# Patient Record
Sex: FEMALE | Race: OTHER | HISPANIC OR LATINO | Employment: FULL TIME | URBAN - METROPOLITAN AREA
[De-identification: names, ages, dates, MRNs, and addresses within clinical notes are randomized per-mention and may not be internally consistent; named-entity substitution may affect disease eponyms.]

---

## 2024-01-27 ENCOUNTER — HOSPITAL ENCOUNTER (OUTPATIENT)
Facility: MEDICAL CENTER | Age: 24
End: 2024-01-29
Attending: HOSPITALIST | Admitting: HOSPITALIST
Payer: COMMERCIAL

## 2024-01-27 ENCOUNTER — HOSPITAL ENCOUNTER (OUTPATIENT)
Dept: RADIOLOGY | Facility: MEDICAL CENTER | Age: 24
End: 2024-01-27

## 2024-01-27 DIAGNOSIS — D50.9 MICROCYTIC ANEMIA: ICD-10-CM

## 2024-01-27 DIAGNOSIS — J10.1 INFLUENZA A: ICD-10-CM

## 2024-01-27 PROBLEM — R55 SYNCOPE AND COLLAPSE: Status: ACTIVE | Noted: 2024-01-27

## 2024-01-27 PROBLEM — K81.0 ACUTE CHOLECYSTITIS: Status: ACTIVE | Noted: 2024-01-27

## 2024-01-27 PROBLEM — R79.89 ELEVATED LFTS: Status: ACTIVE | Noted: 2024-01-27

## 2024-01-27 PROBLEM — E87.6 HYPOKALEMIA: Status: ACTIVE | Noted: 2024-01-27

## 2024-01-27 PROBLEM — K83.8 COMMON BILE DUCT DILATION: Status: ACTIVE | Noted: 2024-01-27

## 2024-01-27 LAB
INR PPP: 1.04 (ref 0.87–1.13)
LACTATE SERPL-SCNC: 0.9 MMOL/L (ref 0.5–2)
PROTHROMBIN TIME: 14 SEC (ref 12–14.6)
TROPONIN T SERPL-MCNC: <6 NG/L (ref 6–19)

## 2024-01-27 PROCEDURE — 700105 HCHG RX REV CODE 258: Performed by: HOSPITALIST

## 2024-01-27 PROCEDURE — 94760 N-INVAS EAR/PLS OXIMETRY 1: CPT

## 2024-01-27 PROCEDURE — 84484 ASSAY OF TROPONIN QUANT: CPT

## 2024-01-27 PROCEDURE — 83605 ASSAY OF LACTIC ACID: CPT

## 2024-01-27 PROCEDURE — 700111 HCHG RX REV CODE 636 W/ 250 OVERRIDE (IP): Mod: JZ | Performed by: HOSPITALIST

## 2024-01-27 PROCEDURE — 96365 THER/PROPH/DIAG IV INF INIT: CPT

## 2024-01-27 PROCEDURE — 85610 PROTHROMBIN TIME: CPT

## 2024-01-27 PROCEDURE — 99223 1ST HOSP IP/OBS HIGH 75: CPT | Performed by: HOSPITALIST

## 2024-01-27 PROCEDURE — 96367 TX/PROPH/DG ADDL SEQ IV INF: CPT

## 2024-01-27 PROCEDURE — 36415 COLL VENOUS BLD VENIPUNCTURE: CPT

## 2024-01-27 PROCEDURE — 93005 ELECTROCARDIOGRAM TRACING: CPT | Performed by: HOSPITALIST

## 2024-01-27 PROCEDURE — 770001 HCHG ROOM/CARE - MED/SURG/GYN PRIV*

## 2024-01-27 PROCEDURE — 87040 BLOOD CULTURE FOR BACTERIA: CPT | Mod: 91

## 2024-01-27 RX ORDER — SODIUM CHLORIDE, SODIUM LACTATE, POTASSIUM CHLORIDE, AND CALCIUM CHLORIDE .6; .31; .03; .02 G/100ML; G/100ML; G/100ML; G/100ML
500 INJECTION, SOLUTION INTRAVENOUS
Status: DISCONTINUED | OUTPATIENT
Start: 2024-01-27 | End: 2024-01-28

## 2024-01-27 RX ORDER — METRONIDAZOLE 500 MG/100ML
500 INJECTION, SOLUTION INTRAVENOUS EVERY 12 HOURS
Status: DISCONTINUED | OUTPATIENT
Start: 2024-01-27 | End: 2024-01-28

## 2024-01-27 RX ORDER — ONDANSETRON 2 MG/ML
4 INJECTION INTRAMUSCULAR; INTRAVENOUS EVERY 4 HOURS PRN
Status: ACTIVE | OUTPATIENT
Start: 2024-01-27 | End: 2024-01-28

## 2024-01-27 RX ORDER — OSELTAMIVIR PHOSPHATE 75 MG/1
75 CAPSULE ORAL 2 TIMES DAILY
Status: DISCONTINUED | OUTPATIENT
Start: 2024-01-28 | End: 2024-01-29 | Stop reason: HOSPADM

## 2024-01-27 RX ORDER — LABETALOL HYDROCHLORIDE 5 MG/ML
10 INJECTION, SOLUTION INTRAVENOUS EVERY 4 HOURS PRN
Status: ACTIVE | OUTPATIENT
Start: 2024-01-27 | End: 2024-01-28

## 2024-01-27 RX ORDER — ACETAMINOPHEN 500 MG
1000 TABLET ORAL 2 TIMES DAILY PRN
COMMUNITY

## 2024-01-27 RX ORDER — ACETAMINOPHEN 325 MG/1
650 TABLET ORAL EVERY 6 HOURS PRN
Status: ACTIVE | OUTPATIENT
Start: 2024-01-27 | End: 2024-01-28

## 2024-01-27 RX ADMIN — METRONIDAZOLE 500 MG: 5 INJECTION, SOLUTION INTRAVENOUS at 23:57

## 2024-01-27 RX ADMIN — CEFTRIAXONE SODIUM 2000 MG: 2 INJECTION, POWDER, FOR SOLUTION INTRAMUSCULAR; INTRAVENOUS at 23:03

## 2024-01-27 ASSESSMENT — LIFESTYLE VARIABLES
EVER_SMOKED: NEVER
ON A TYPICAL DAY WHEN YOU DRINK ALCOHOL HOW MANY DRINKS DO YOU HAVE: 0
EVER FELT BAD OR GUILTY ABOUT YOUR DRINKING: NO
TOTAL SCORE: 0
HAVE YOU EVER FELT YOU SHOULD CUT DOWN ON YOUR DRINKING: NO
CONSUMPTION TOTAL: NEGATIVE
TOTAL SCORE: 0
AVERAGE NUMBER OF DAYS PER WEEK YOU HAVE A DRINK CONTAINING ALCOHOL: 0
HAVE PEOPLE ANNOYED YOU BY CRITICIZING YOUR DRINKING: NO
EVER HAD A DRINK FIRST THING IN THE MORNING TO STEADY YOUR NERVES TO GET RID OF A HANGOVER: NO
TOTAL SCORE: 0
HOW MANY TIMES IN THE PAST YEAR HAVE YOU HAD 5 OR MORE DRINKS IN A DAY: 0
ALCOHOL_USE: NO

## 2024-01-27 ASSESSMENT — ENCOUNTER SYMPTOMS
DOUBLE VISION: 0
DIZZINESS: 0
NECK PAIN: 0
FEVER: 1
CHILLS: 1
NAUSEA: 1
SORE THROAT: 0
SHORTNESS OF BREATH: 0
ABDOMINAL PAIN: 1
INSOMNIA: 0
DEPRESSION: 0
VOMITING: 0
WEAKNESS: 0
FALLS: 1
HEADACHES: 0
MYALGIAS: 0
BRUISES/BLEEDS EASILY: 0
BLURRED VISION: 0
PALPITATIONS: 0
COUGH: 0

## 2024-01-27 ASSESSMENT — COGNITIVE AND FUNCTIONAL STATUS - GENERAL
SUGGESTED CMS G CODE MODIFIER MOBILITY: CH
SUGGESTED CMS G CODE MODIFIER DAILY ACTIVITY: CH
DAILY ACTIVITIY SCORE: 24
MOBILITY SCORE: 24

## 2024-01-27 ASSESSMENT — PATIENT HEALTH QUESTIONNAIRE - PHQ9
SUM OF ALL RESPONSES TO PHQ9 QUESTIONS 1 AND 2: 0
1. LITTLE INTEREST OR PLEASURE IN DOING THINGS: NOT AT ALL
2. FEELING DOWN, DEPRESSED, IRRITABLE, OR HOPELESS: NOT AT ALL
2. FEELING DOWN, DEPRESSED, IRRITABLE, OR HOPELESS: NOT AT ALL
SUM OF ALL RESPONSES TO PHQ9 QUESTIONS 1 AND 2: 0
1. LITTLE INTEREST OR PLEASURE IN DOING THINGS: NOT AT ALL

## 2024-01-27 ASSESSMENT — PAIN DESCRIPTION - PAIN TYPE
TYPE: ACUTE PAIN
TYPE: ACUTE PAIN

## 2024-01-27 ASSESSMENT — COPD QUESTIONNAIRES
COPD SCREENING SCORE: 0
HAVE YOU SMOKED AT LEAST 100 CIGARETTES IN YOUR ENTIRE LIFE: NO/DON'T KNOW
DURING THE PAST 4 WEEKS HOW MUCH DID YOU FEEL SHORT OF BREATH: NONE/LITTLE OF THE TIME
DO YOU EVER COUGH UP ANY MUCUS OR PHLEGM?: NO/ONLY WITH OCCASIONAL COLDS OR INFECTIONS

## 2024-01-28 ENCOUNTER — APPOINTMENT (OUTPATIENT)
Dept: RADIOLOGY | Facility: MEDICAL CENTER | Age: 24
End: 2024-01-28
Attending: HOSPITALIST
Payer: COMMERCIAL

## 2024-01-28 ENCOUNTER — APPOINTMENT (OUTPATIENT)
Dept: CARDIOLOGY | Facility: MEDICAL CENTER | Age: 24
End: 2024-01-28
Attending: HOSPITALIST
Payer: COMMERCIAL

## 2024-01-28 LAB
ALBUMIN SERPL BCP-MCNC: 3.3 G/DL (ref 3.2–4.9)
ALBUMIN/GLOB SERPL: 1.5 G/DL
ALP SERPL-CCNC: 42 U/L (ref 30–99)
ALT SERPL-CCNC: 92 U/L (ref 2–50)
ANION GAP SERPL CALC-SCNC: 11 MMOL/L (ref 7–16)
AST SERPL-CCNC: 89 U/L (ref 12–45)
BASOPHILS # BLD AUTO: 0.2 % (ref 0–1.8)
BASOPHILS # BLD: 0.01 K/UL (ref 0–0.12)
BILIRUB SERPL-MCNC: 0.2 MG/DL (ref 0.1–1.5)
BUN SERPL-MCNC: 9 MG/DL (ref 8–22)
CALCIUM ALBUM COR SERPL-MCNC: 8.4 MG/DL (ref 8.5–10.5)
CALCIUM SERPL-MCNC: 7.8 MG/DL (ref 8.4–10.2)
CHLORIDE SERPL-SCNC: 109 MMOL/L (ref 96–112)
CO2 SERPL-SCNC: 19 MMOL/L (ref 20–33)
CREAT SERPL-MCNC: 0.46 MG/DL (ref 0.5–1.4)
EOSINOPHIL # BLD AUTO: 0.01 K/UL (ref 0–0.51)
EOSINOPHIL NFR BLD: 0.2 % (ref 0–6.9)
ERYTHROCYTE [DISTWIDTH] IN BLOOD BY AUTOMATED COUNT: 51.5 FL (ref 35.9–50)
GFR SERPLBLD CREATININE-BSD FMLA CKD-EPI: 139 ML/MIN/1.73 M 2
GLOBULIN SER CALC-MCNC: 2.2 G/DL (ref 1.9–3.5)
GLUCOSE SERPL-MCNC: 72 MG/DL (ref 65–99)
HCT VFR BLD AUTO: 28.7 % (ref 37–47)
HGB BLD-MCNC: 8.8 G/DL (ref 12–16)
IMM GRANULOCYTES # BLD AUTO: 0 K/UL (ref 0–0.11)
IMM GRANULOCYTES NFR BLD AUTO: 0 % (ref 0–0.9)
IRON SATN MFR SERPL: 11 % (ref 15–55)
IRON SERPL-MCNC: 38 UG/DL (ref 40–170)
LACTATE SERPL-SCNC: 0.8 MMOL/L (ref 0.5–2)
LV EJECT FRACT  99904: 60
LYMPHOCYTES # BLD AUTO: 2.37 K/UL (ref 1–4.8)
LYMPHOCYTES NFR BLD: 59.1 % (ref 22–41)
MCH RBC QN AUTO: 21.7 PG (ref 27–33)
MCHC RBC AUTO-ENTMCNC: 30.7 G/DL (ref 32.2–35.5)
MCV RBC AUTO: 70.9 FL (ref 81.4–97.8)
MONOCYTES # BLD AUTO: 0.26 K/UL (ref 0–0.85)
MONOCYTES NFR BLD AUTO: 6.5 % (ref 0–13.4)
NEUTROPHILS # BLD AUTO: 1.36 K/UL (ref 1.82–7.42)
NEUTROPHILS NFR BLD: 34 % (ref 44–72)
NRBC # BLD AUTO: 0 K/UL
NRBC BLD-RTO: 0 /100 WBC (ref 0–0.2)
PLATELET # BLD AUTO: 217 K/UL (ref 164–446)
PMV BLD AUTO: 9.7 FL (ref 9–12.9)
POTASSIUM SERPL-SCNC: 3.8 MMOL/L (ref 3.6–5.5)
PROT SERPL-MCNC: 5.5 G/DL (ref 6–8.2)
RBC # BLD AUTO: 4.05 M/UL (ref 4.2–5.4)
SODIUM SERPL-SCNC: 139 MMOL/L (ref 135–145)
TIBC SERPL-MCNC: 337 UG/DL (ref 250–450)
TROPONIN T SERPL-MCNC: <6 NG/L (ref 6–19)
TSH SERPL DL<=0.005 MIU/L-ACNC: 3.19 UIU/ML (ref 0.38–5.33)
UIBC SERPL-MCNC: 299 UG/DL (ref 110–370)
WBC # BLD AUTO: 4 K/UL (ref 4.8–10.8)

## 2024-01-28 PROCEDURE — 700101 HCHG RX REV CODE 250: Performed by: INTERNAL MEDICINE

## 2024-01-28 PROCEDURE — 93306 TTE W/DOPPLER COMPLETE: CPT

## 2024-01-28 PROCEDURE — 700111 HCHG RX REV CODE 636 W/ 250 OVERRIDE (IP): Mod: JZ | Performed by: HOSPITALIST

## 2024-01-28 PROCEDURE — A9270 NON-COVERED ITEM OR SERVICE: HCPCS | Performed by: HOSPITALIST

## 2024-01-28 PROCEDURE — 36415 COLL VENOUS BLD VENIPUNCTURE: CPT

## 2024-01-28 PROCEDURE — 83550 IRON BINDING TEST: CPT

## 2024-01-28 PROCEDURE — 84443 ASSAY THYROID STIM HORMONE: CPT

## 2024-01-28 PROCEDURE — 93306 TTE W/DOPPLER COMPLETE: CPT | Mod: 26 | Performed by: INTERNAL MEDICINE

## 2024-01-28 PROCEDURE — 83605 ASSAY OF LACTIC ACID: CPT

## 2024-01-28 PROCEDURE — 83540 ASSAY OF IRON: CPT

## 2024-01-28 PROCEDURE — 700102 HCHG RX REV CODE 250 W/ 637 OVERRIDE(OP): Performed by: HOSPITALIST

## 2024-01-28 PROCEDURE — 84484 ASSAY OF TROPONIN QUANT: CPT

## 2024-01-28 PROCEDURE — 85025 COMPLETE CBC W/AUTO DIFF WBC: CPT

## 2024-01-28 PROCEDURE — 99232 SBSQ HOSP IP/OBS MODERATE 35: CPT | Performed by: INTERNAL MEDICINE

## 2024-01-28 PROCEDURE — G0378 HOSPITAL OBSERVATION PER HR: HCPCS

## 2024-01-28 PROCEDURE — 80053 COMPREHEN METABOLIC PANEL: CPT

## 2024-01-28 PROCEDURE — 94760 N-INVAS EAR/PLS OXIMETRY 1: CPT

## 2024-01-28 PROCEDURE — 96367 TX/PROPH/DG ADDL SEQ IV INF: CPT

## 2024-01-28 PROCEDURE — 700105 HCHG RX REV CODE 258: Performed by: INTERNAL MEDICINE

## 2024-01-28 RX ORDER — SODIUM CHLORIDE 9 MG/ML
1000 INJECTION, SOLUTION INTRAVENOUS ONCE
Status: COMPLETED | OUTPATIENT
Start: 2024-01-28 | End: 2024-01-28

## 2024-01-28 RX ORDER — SODIUM CHLORIDE AND POTASSIUM CHLORIDE 150; 900 MG/100ML; MG/100ML
INJECTION, SOLUTION INTRAVENOUS CONTINUOUS
Status: DISCONTINUED | OUTPATIENT
Start: 2024-01-28 | End: 2024-01-29 | Stop reason: HOSPADM

## 2024-01-28 RX ADMIN — POTASSIUM CHLORIDE AND SODIUM CHLORIDE: 900; 150 INJECTION, SOLUTION INTRAVENOUS at 17:44

## 2024-01-28 RX ADMIN — POTASSIUM CHLORIDE AND SODIUM CHLORIDE: 900; 150 INJECTION, SOLUTION INTRAVENOUS at 07:59

## 2024-01-28 RX ADMIN — OSELTAMIVIR PHOSPHATE 75 MG: 75 CAPSULE ORAL at 17:46

## 2024-01-28 RX ADMIN — POTASSIUM CHLORIDE AND SODIUM CHLORIDE: 900; 150 INJECTION, SOLUTION INTRAVENOUS at 23:38

## 2024-01-28 RX ADMIN — SODIUM CHLORIDE 1000 ML: 9 INJECTION, SOLUTION INTRAVENOUS at 14:30

## 2024-01-28 RX ADMIN — METRONIDAZOLE 500 MG: 5 INJECTION, SOLUTION INTRAVENOUS at 05:19

## 2024-01-28 ASSESSMENT — FIBROSIS 4 INDEX
FIB4 SCORE: 0.9
FIB4 SCORE: 0.9

## 2024-01-28 ASSESSMENT — PAIN DESCRIPTION - PAIN TYPE
TYPE: ACUTE PAIN
TYPE: ACUTE PAIN

## 2024-01-28 ASSESSMENT — ENCOUNTER SYMPTOMS
VOMITING: 0
ABDOMINAL PAIN: 0
NAUSEA: 0

## 2024-01-28 ASSESSMENT — PATIENT HEALTH QUESTIONNAIRE - PHQ9
SUM OF ALL RESPONSES TO PHQ9 QUESTIONS 1 AND 2: 0
2. FEELING DOWN, DEPRESSED, IRRITABLE, OR HOPELESS: NOT AT ALL
1. LITTLE INTEREST OR PLEASURE IN DOING THINGS: NOT AT ALL

## 2024-01-28 NOTE — PROGRESS NOTES
1410: MD Leavitt notified of BP  86/55 pt lethargic, HR in 40s per MT, pt in nuc med for HIDA scan at this time. New order received for NS bolus. IV placed, bolus started.

## 2024-01-28 NOTE — PROGRESS NOTES
Telemetry Shift Summary     Rhythm: SB/SA  HR: 54-56, touched down to 45  Ectopy: r PVC    Measurements: 0.16/0.08/0.34    Normal Values  Rhythm: SR  HR:   Measurements: 0.12-0.20/0.08-0.10/0.30-0.52

## 2024-01-28 NOTE — CONSULTS
Thoracic & General Surgery Consultation      Date of Service  1/28/2024    Referring Physician  GERMAN Devlin.*    Consulting Physician  Zack Ba M.D.    Reason for Consultation  Abdominal pain, fainting    History of Presenting Illness  Ms. Meredith Worley is a 23 y.o. female who presented 1/27/2024 to the hospital with lightheadedness and fainting.  She is visiting from [place] and yesterday felt weak like she was about to pass out and went and sat down on the chair and then passed out.  She was brought in to the emergency room was found to have a hemoglobin of 7.  CT scan was performed which saw potentially dilated ducts in the liver.  She was also found to be positive for the flu.  She has a history of heavy menses and has had issues with low blood counts in the past.  She had a vague history of abdominal pain in the right upper quadrant.  This is not the reason she came in.  She is not having any pain now.  She states this pain in the past comes on after eating last for about 20 minutes and is mild.  Sometimes associate with nausea.  She currently denies fevers, chest pain, chills, shortness of breath, cough.    Review of Systems  All other systems reviewed and negative except as noted above    Past Medical History   has a past medical history of Patient denies medical problems.    Surgical History   has no past surgical history on file.    Family History  family history is not on file.    Social History   reports that she has never smoked. She has never used smokeless tobacco. She reports that she does not drink alcohol and does not use drugs.    Medications  Current Facility-Administered Medications   Medication Dose Route Frequency Provider Last Rate Last Admin    0.9 % NaCl with KCl 20 mEq infusion   Intravenous Continuous Olivia Leavitt M.D.   Dose not Required at 01/28/24 0800    cefTRIAXone (Rocephin) 2,000 mg in  mL IVPB  2,000 mg Intravenous Q EVENING Cindy  JUAN LUIS Sepulveda   Stopped at 01/27/24 2333    metroNIDAZOLE (Flagyl) IVPB 500 mg  500 mg Intravenous Q12HRS Cindy Sepulveda M.D.   Stopped at 01/28/24 0619    oseltamivir (Tamiflu) capsule 75 mg  75 mg Oral BID Cindy Sepulveda M.D.           Allergies  Allergies   Allergen Reactions    Nsaids      Pt reports that she passed out       Physical Exam  Temp:  [36.1 °C (97 °F)-37.4 °C (99.3 °F)] 36.1 °C (97 °F)  Pulse:  [51-64] 56  Resp:  [18] 18  BP: ()/(58-67) 97/62  SpO2:  [94 %-97 %] 94 %    GEN: Healthy appearing, well developed, no acute distress.  PSYCH: Alert and oriented x3. Normal  memory, mood, and affect.  HEENT     -Head: Normocephalic, atraumatic     -Eyes: PERRL, No discharge or redness;     -Ears: External ears are normal.      -Nose: Normal nares.     -Throat: moist mucus membranes, good dentition    NECK: Supple, trachea midline with no masses.  CV: RRR, radial pulses 2+, brisk cap refill on hands  LUNGS: no labored breathing on room air, no wheezing  ABD: Soft, nontender, non-distended, negative Gregory sign  SKIN: Warm, well perfused. No skin rashes or abnormal lesions.  MSK: strength 5/5 throughout. No deformities  EXT: No clubbing, cyanosis, or edema.  NEURO: No focal deficits      Fluids      Laboratory  Recent Labs     01/27/24  1405 01/28/24  0248   WBC 8.3 4.0*   RBC 3.69* 4.05*   HEMOGLOBIN 7.8* 8.8*   HEMATOCRIT 25.1* 28.7*   MCV 68.0* 70.9*   MCH 21.1* 21.7*   MCHC 31.1* 30.7*   RDW 18.9* 51.5*   PLATELETCT 244 217   MPV 9.6 9.7     Recent Labs     01/27/24  1405 01/28/24  0248   SODIUM 137 139   POTASSIUM 3.2* 3.8   CHLORIDE 111* 109   CO2 20* 19*   GLUCOSE 84 72   BUN 17 9   CREATININE 0.4* 0.46*   CALCIUM 7.7* 7.8*     Recent Labs     01/27/24  1421 01/27/24  2242   APTT 27.6  --    INR 1.04 1.04                 Imaging  EC-ECHOCARDIOGRAM COMPLETE W/O CONT         NM-BILIARY (HIDA) SCAN WITH CCK    (Results Pending)       Assessment/Plan  This is a 23 y.o. female  who presents with influenza.  She was also anemic and had a blood transfusion at the outside hospital.  Due to this vague history of right upper quadrant abdominal pain recommending a HIDA scan to make sure she does not have acute cholecystitis.      Zack Ba MD  Thoracic & General Surgeon  [facility]  [phone]

## 2024-01-28 NOTE — PROGRESS NOTES
2026 - received report from Manpreet ESTEVES from [facility] w/ ETA 2 hrs, ER nurse reported that pt has fluids, 1 PRBC completed, Reglan for pain, and Potassium IV done at their ER.     2212- Pt arrived via gurney, admitted to room 213 direct admit with her friend. Pt is A&O x 4, RA, Pt denies nausea at this time and no significant pain. Pt oriented to room and call light. POC reviewed which includes strict NPO and IV abx. Call light within reach, bed alarm on, fall precautions in place, all needs met at this time.    - admit profile done, med rec and allergies checked. Pt instructed strict NPO, verbalized understanding  - on droplet precaution.     2020 - Dr Woodward in pt's room at this time assessing pt.   2025 - EKG tech in room; cardiac monitor placed after.  2035-  in room to draw stat labs.    2040 - Dr Woodward saw EKG result; cardiac monitor placed; Edil tech informed and acknowledged.  2300 - IV abx started; pt tolerated no adverse effects.  2303 - Edil tech texted via voalte that pt HR at 48, checked pt after and pt was resting in bed, normal RR, not in distress, latest HR at 56.

## 2024-01-28 NOTE — DISCHARGE PLANNING
Patient rolled back to observation/outpatient status per attending MD determination () and UR committee MD secondary review (Dr. Nielson).  Condition code 44 completed.

## 2024-01-28 NOTE — PROGRESS NOTES
4 Eyes Skin Assessment Completed by Gloria RN and Sandi RN.    Head WDL  Ears WDL  Nose WDL  Mouth WDL  Neck WDL  Breast/Chest WDL  Shoulder Blades WDL  Spine WDL  (R) Arm/Elbow/Hand WDL  (L) Arm/Elbow/Hand WDL  Abdomen WDL  Groin WDL  Scrotum/Coccyx/Buttocks WDL  (R) Leg WDL  (L) Leg WDL  (R) Heel/Foot/Toe WDL  (L) Heel/Foot/Toe WDL          Devices In Places Pulse Ox, telebox monitor      Interventions In Place N/A    Possible Skin Injury No    Pictures Uploaded Into Epic N/A  Wound Consult Placed N/A  RN Wound Prevention Protocol Ordered No

## 2024-01-28 NOTE — PROGRESS NOTES
Medication history reviewed with pt. Med rec is complete.  Allergies reviewed, per pt  Interviewed pt with sister at bedside with permission from pt.    Pt reports no prescription medications or vitamins in the last 30 days or longer.    Patient has not had any outpatient antibiotics in the last 30 days.    Pt is not on any anticoagulants

## 2024-01-28 NOTE — CARE PLAN
The patient is Stable - Low risk of patient condition declining or worsening    Shift Goals  Clinical Goals: Pt will maintain on strict NPO, start IV abx, HIDA scan tomorrow.  Patient Goals: Sleep and rest.  Family Goals: na    Progress made toward(s) clinical / shift goals:      Pt maintained on strict NPO since admit. IV abx started w/ no adverse effects, pt ambulated to bathroom. Pt for Hida scan.    Patient is not progressing towards the following goals:

## 2024-01-28 NOTE — ASSESSMENT & PLAN NOTE
Multifactorial likely secondary to viral syndrome coupled with anemia coupled with hypovolemia.  Hemoglobin stable after 1 unit PRBCs but still became hypotensive and symptomatic in HIDA scan  Fluid bolus ordered, increase baseline IV fluids, recheck labs in the morning.  Orthostatic vital signs in the morning  Not yet stable for discharge due to orthostatic hypotension

## 2024-01-28 NOTE — H&P
"Hospital Medicine History & Physical Note    Date of Service  1/27/2024    Primary Care Physician  Pcp Not In Computer    Consultants  general surgery    Specialist Names: Discussed the case with Dr. Ba    Code Status  Full Code    Chief Complaint  Direct Admission from [facility]: for Acute Cholecystitis, Severe Anemia and Syncope    History of Presenting Illness  Meredith Worley is a 23 y.o. female, who presented at outside facility ER after Near/ Syncopal event. Patient not feeling well for 3 days with ongoing RUQ pain and Nausea. Had a Fever 3 days ago. She was at work and started feeling like \" I was going to pass out\". She went to the cafeteria and was sitting on a chair when lost consciousness. She did not fall, no injuries. She called her sister who took her to the ED. Heavy Menstrual cycle 2 weeks ago, but currently not bleeding.    She tested positive for Influenza A. CT Abdomen showed dilated Common Bile Duct and normal Gall bladder.      She is coming as a direct admission to [facility] on 1/27/2024 for further evaluation. Currently pain free. .    I discussed the plan of care with patient and I also received full sign out from Dr. Barahona, Hospitalist who accepted this patient. I also dicussed with Dr. Lincoln, General surgery team.   .    Review of Systems  Review of Systems   Constitutional:  Positive for chills, fever and malaise/fatigue.   HENT:  Negative for congestion and sore throat.    Eyes:  Negative for blurred vision and double vision.   Respiratory:  Negative for cough and shortness of breath.    Cardiovascular:  Negative for chest pain and palpitations.   Gastrointestinal:  Positive for abdominal pain and nausea. Negative for vomiting.   Genitourinary:  Negative for dysuria and urgency.   Musculoskeletal:  Positive for falls. Negative for myalgias and neck pain.   Skin:  Negative for itching and rash.   Neurological:  Negative for dizziness, weakness and " headaches.   Endo/Heme/Allergies:  Does not bruise/bleed easily.   Psychiatric/Behavioral:  Negative for depression. The patient does not have insomnia.        Past Medical History   has a past medical history of Patient denies medical problems.    Surgical History  Denies prior surgeries    Family History  Reviewed and not pertinent  Family history reviewed with patient. There is no family history that is pertinent to the chief complaint.     Social History   reports that she has never smoked. She has never used smokeless tobacco. She reports that she does not drink alcohol and does not use drugs.    Allergies  Allergies   Allergen Reactions    Nsaids Swelling       Medications  None       Physical Exam  Temp:  [36.8 °C (98.2 °F)-37.4 °C (99.4 °F)] 36.8 °C (98.2 °F)  Pulse:  [60-72] 70  Resp:  [12-23] 20  BP: ()/(55-75) 106/75  SpO2:  [98 %-100 %] 99 %    Physical Exam  Constitutional:       Appearance: Normal appearance.   HENT:      Head: Normocephalic and atraumatic.      Mouth/Throat:      Mouth: Mucous membranes are moist.      Pharynx: Oropharynx is clear.   Eyes:      Extraocular Movements: Extraocular movements intact.      Pupils: Pupils are equal, round, and reactive to light.   Cardiovascular:      Rate and Rhythm: Normal rate and regular rhythm.      Heart sounds: Normal heart sounds.   Pulmonary:      Effort: Pulmonary effort is normal. No respiratory distress.      Breath sounds: Normal breath sounds.   Abdominal:      General: Abdomen is flat. Bowel sounds are normal.      Palpations: Abdomen is soft.      Tenderness: There is no abdominal tenderness. There is no guarding or rebound.   Musculoskeletal:      Cervical back: Normal range of motion and neck supple.   Skin:     General: Skin is warm and dry.   Neurological:      General: No focal deficit present.      Mental Status: She is alert and oriented to person, place, and time.   Psychiatric:         Mood and Affect: Mood normal.          Behavior: Behavior normal.         Laboratory:  Recent Labs     01/27/24  1405   WBC 8.3   RBC 3.69*   HEMOGLOBIN 7.8*   HEMATOCRIT 25.1*   MCV 68.0*   MCH 21.1*   MCHC 31.1*   RDW 18.9*   PLATELETCT 244   MPV 9.6     Recent Labs     01/27/24  1405   SODIUM 137   POTASSIUM 3.2*   CHLORIDE 111*   CO2 20*   GLUCOSE 84   BUN 17   CREATININE 0.4*   CALCIUM 7.7*     Recent Labs     01/27/24  1405   ALTSGPT 81*   ASTSGOT 122*   ALKPHOSPHAT 56   TBILIRUBIN 0.3   LIPASE 203   GLUCOSE 84     Recent Labs     01/27/24  1421   APTT 27.6   INR 1.04         Imaging:  EC-ECHOCARDIOGRAM COMPLETE W/ CONT    (Results Pending)     CT Abdomen done at Outside Facility earlier today:  IMPRESSION:     Mild abdominal distention with fluid. Nonobstructed bowel gas pattern. Normal small bowel.     Profound intrahepatic biliary ductal dilatation.     Normal gallbladder    Portable Chest Xray done at Outside Facility:  IMPRESSION:        No evidence for acute cardiopulmonary disease on this AP portable exam.    MRI MRCP  IMPRESSION: Mild free fluid in the upper abdomen predominantlyin the region of gallbladder fossa  and surrounding the liver with question tinystone in the gallbladder neck, cannot exclude acute  cholecystitis, recommend follow-up with right upper quadrant ultrasound and/or HIDAscan for further  characterization. Otherwise unremarkable exam.  Specifically, the remainder of the visualized abdominal viscera are normal.  INCIDENTAL FINDINGS:Comparison:CT dated 1/27/2024.  Small for fluid in the upper abdomen, more significant in the gallbladder fossa and surrounding the  liver, etiologyindeterminate. The gallbladder reveals a questionable tinypunctate stone near the  gallbladder neck, otherwise unremarkable. Remainder of abdominal viscera unremarkable.  Radiologist: Patti Perales MD      US RUQ  IMPRESSION: Edema of the gallbladder wall with hyperemia and pericholecystic fluid, findings  concerning for cholecystitis, possible  acalculous etiologyalthough a nonvisualized stone near the  gallbladder neck as suggested on previous MRI cannot be entirelyexcluded. If indicated, follow-up  with HIDAscan recommended for confirmation.  Mild right pelviectasis, remainder of the examunremarkable.  INCIDENTAL FINDINGS:Comparison:None.  Examis limited due to gas artifact in the bowel.  Pancreas is not visualized.  The liver measures 12.2 cm. The main portal vein measures 10 mmwith hepatopedal flow.  Gallbladder wall is thickened up to 7 mmwith hyperemia and heterogeneous echo pattern. There is  pericholecystic fluid, combination of findings concerning for cholecystitis, possible acalculous  cholecystitis. Adistinct stone is not visualized.  The right kidneymeasured 10.5 cmwith nonspecific mild fullness of the right renal pelviswhich may  indicate pelviectasis.  MRCP and RUQ US were done this afternoon at outside facility but official reading report is still pending.    X-Ray:  I reviewed above imaging as above  EKG:  I have personally reviewed the images and compared with prior images. and My impression is: Sinus bradycardia at 51 bpm. No acute ST elevation.     Assessment/Plan:  Justification for Admission Status  I anticipate this patient will require at least two midnights for appropriate medical management, necessitating inpatient admission because 22 yo F, coming as a Direct Admission from [facility]. RUQ pain, mild LFT elevation. Possible Acalculous Cholecystitis, vs recently passed stone give Common Bile Duct Dilation. She was also anemic (Hgb 7.8) and received 1 Unit of PRBC prior transfer. Influenza A positive at outside facility        Common bile duct dilation  Assessment & Plan  -Patient has Common Bile Duct Dilation on CT Abdomen.   -MRCP did not showed Stones. LFTs mild elevation (ALT 81 and ). Normal Total Bili.  -MRCP shoed mild free fluid in the upper abdomen, questionable small (tiny) stone. Can't exclude  Cholecystitis.  -RUQ US showed pericholecystic fluid concerning for cholecystitis or possible acalculous cholecystitis.  -Repeat Labs at 12 am.   -Started Rocephin and Metronidazole  -Ordered HIDA Scan.  -NPO and Pain control   -I discussed this case with general surgery Dr. Ba, he will see patient in am. I appreciate consult and recommendations    Syncope and collapse  Assessment & Plan  -Likely due to hypotension secondary to anemia under the context of influenza.   -Patient initial hemoglobin was 7.8. She received 1 Unit PRBC prior transfer.  -I repeated her EKG showing Bradycardia at 51 bpm. Will monitor with remote cardiac and check serial troponin. ACS unlikely. No Chest pain at any time.  -Ordered Echocardiogram in am    Microcytic anemia  Assessment & Plan  -Initial hemoglobin 7.8, received 1 Unit PRBC at outside facility.  -Requeck labs at 12 am  -Reports heavy menses 2 weeks ago. Not currently bleeding.     Elevated LFTs  Assessment & Plan  -Mild elevated. See discussion above for plan    Influenza A  Assessment & Plan  -Tested positive for Influenza. Added tamiflu  -Droplet precaution    Hypokalemia  Assessment & Plan  -Initial Potassium was 3.2  -Recheck labs at 12 am and replace as needed        VTE prophylaxis: SCDs/TEDs

## 2024-01-28 NOTE — PROGRESS NOTES
Hospital Medicine Daily Progress Note    Date of Service  1/28/2024    Chief Complaint  Meredith Worley is a 23 y.o. female admitted 1/27/2024 with syncope and abnormal LFTs, at outside facility she was found to be anemic with hemoglobin of 7.8 and reported menorrhagia.  In addition she has had influenza symptoms for 3 to 4 days being the last person in her family to have the influenza at the outside facility she was found to have abnormal LFTs as well as a dilated common bile duct with a questionable punctate stone.  She was started on empiric antibiotics for cholecystitis, did receive 1 unit packed red cells at the other facility.    Hospital Course  Hemoglobin increased as expected following 1 unit, HIDA scan was negative and patient's abdomen was benign.  She remained orthostatic and IV fluids were increased.    Interval Problem Update  Patient had symptomatic orthostasis while on HIDA scan.  She was also slightly bradycardic.  IV fluids have been increased, subsequently HIDA scan negative she will be started on regular diet and antibiotics other than Tamiflu have been discontinued.    I have discussed this patient's plan of care and discharge plan at IDT rounds today with Case Management, Nursing, Nursing leadership, and other members of the IDT team.    Consultants/Specialty  None    Code Status  Full Code    Disposition  The patient is not medically cleared for discharge to home or a post-acute facility.  Anticipate discharge to: home with close outpatient follow-up    I have placed the appropriate orders for post-discharge needs.    Review of Systems  Review of Systems   Constitutional:  Positive for malaise/fatigue.   Gastrointestinal:  Negative for abdominal pain, nausea and vomiting.        Physical Exam  Temp:  [36.1 °C (97 °F)-37.4 °C (99.4 °F)] 36.1 °C (97 °F)  Pulse:  [51-72] 56  Resp:  [16-20] 18  BP: ()/(58-75) 100/61  SpO2:  [94 %-99 %] 97 %    Physical Exam  Vitals and nursing note  reviewed.   Constitutional:       General: She is not in acute distress.     Appearance: She is not ill-appearing.   HENT:      Head: Normocephalic and atraumatic.      Nose: Nose normal.      Mouth/Throat:      Mouth: Mucous membranes are moist.   Eyes:      Extraocular Movements: Extraocular movements intact.      Pupils: Pupils are equal, round, and reactive to light.   Cardiovascular:      Rate and Rhythm: Normal rate and regular rhythm.   Pulmonary:      Effort: Pulmonary effort is normal.      Breath sounds: Normal breath sounds.   Abdominal:      General: Abdomen is flat. Bowel sounds are normal. There is no distension.      Palpations: Abdomen is soft.      Tenderness: There is no abdominal tenderness.   Musculoskeletal:      Right lower leg: No edema.      Left lower leg: No edema.   Skin:     General: Skin is warm and dry.   Neurological:      General: No focal deficit present.      Mental Status: She is alert and oriented to person, place, and time.      Cranial Nerves: No cranial nerve deficit.   Psychiatric:         Mood and Affect: Mood normal.         Fluids    Intake/Output Summary (Last 24 hours) at 1/28/2024 1551  Last data filed at 1/28/2024 0000  Gross per 24 hour   Intake 0 ml   Output 0 ml   Net 0 ml       Laboratory  Recent Labs     01/27/24  1405 01/28/24  0248   WBC 8.3 4.0*   RBC 3.69* 4.05*   HEMOGLOBIN 7.8* 8.8*   HEMATOCRIT 25.1* 28.7*   MCV 68.0* 70.9*   MCH 21.1* 21.7*   MCHC 31.1* 30.7*   RDW 18.9* 51.5*   PLATELETCT 244 217   MPV 9.6 9.7     Recent Labs     01/27/24  1405 01/28/24  0248   SODIUM 137 139   POTASSIUM 3.2* 3.8   CHLORIDE 111* 109   CO2 20* 19*   GLUCOSE 84 72   BUN 17 9   CREATININE 0.4* 0.46*   CALCIUM 7.7* 7.8*     Recent Labs     01/27/24  1421 01/27/24  2242   APTT 27.6  --    INR 1.04 1.04               Imaging  NM-BILIARY (HIDA) SCAN WITH CCK   Final Result         1. Normal hepatobiliary scan. No evidence of acute cholecystitis.      2. Normal gallbladder  ejection fraction.         EC-ECHOCARDIOGRAM COMPLETE W/O CONT              Assessment/Plan  Microcytic anemia  Assessment & Plan  -Initial hemoglobin 7.8, received 1 Unit PRBC at outside facility.  Check iron studies, patient having menorrhagia, check TSH  Hemoglobin better this morning      Hypokalemia  Assessment & Plan  Improved with fluids    Elevated LFTs- (present on admission)  Assessment & Plan  -Mild elevated.   Possibly related to viral syndrome    Common bile duct dilation- (present on admission)  Assessment & Plan  Abdomen benign, normal HIDA  Stopped antibiotics    Influenza A  Assessment & Plan  -Tested positive for Influenza. Added tamiflu  -Droplet precaution    Syncope and collapse  Assessment & Plan  Multifactorial likely secondary to viral syndrome coupled with anemia coupled with hypovolemia.  Hemoglobin stable after 1 unit PRBCs but still became hypotensive and symptomatic in HIDA scan  Fluid bolus ordered, increase baseline IV fluids, recheck labs in the morning.  Orthostatic vital signs in the morning  Not yet stable for discharge due to orthostatic hypotension         VTE prophylaxis:   SCDs/TEDs      I have performed a physical exam and reviewed and updated ROS and Plan today (1/28/2024). In review of yesterday's note (1/27/2024), there are no changes except as documented above.

## 2024-01-28 NOTE — PROGRESS NOTES
Triage officer note    Patient being sent from [facility]  Sending physician Dr. Candi MARK    History of current illness.  23-year-old female reported to the emergency room after a near syncopal/syncopal episode.  Patient was hypotensive.  She is found to have influenza A.  Imaging studies reveal dilation of the biliary ducts.  There is surgeon refused to do cholecystectomy as he feels patient might have a stone in the common bile duct even though MRCP did not reveal stones in the common bile duct.  Needs influenza treatment, fluid resuscitation and stabilization of electrolytes and blood pressure and most likely cholecystectomy.  Patient already had ultrasound of the gallbladder, MRI of the abdomen and gallbladder, CT of the abdomen and gallbladder.

## 2024-01-29 ENCOUNTER — PHARMACY VISIT (OUTPATIENT)
Dept: PHARMACY | Facility: MEDICAL CENTER | Age: 24
End: 2024-01-29
Payer: COMMERCIAL

## 2024-01-29 VITALS
SYSTOLIC BLOOD PRESSURE: 127 MMHG | TEMPERATURE: 98.9 F | DIASTOLIC BLOOD PRESSURE: 71 MMHG | HEIGHT: 63 IN | OXYGEN SATURATION: 95 % | WEIGHT: 131.61 LBS | RESPIRATION RATE: 18 BRPM | BODY MASS INDEX: 23.32 KG/M2 | HEART RATE: 82 BPM

## 2024-01-29 LAB
ANION GAP SERPL CALC-SCNC: 11 MMOL/L (ref 7–16)
ANISOCYTOSIS BLD QL SMEAR: ABNORMAL
BASOPHILS # BLD AUTO: 0 % (ref 0–1.8)
BASOPHILS # BLD: 0 K/UL (ref 0–0.12)
BUN SERPL-MCNC: 10 MG/DL (ref 8–22)
CALCIUM SERPL-MCNC: 8 MG/DL (ref 8.4–10.2)
CHLORIDE SERPL-SCNC: 108 MMOL/L (ref 96–112)
CO2 SERPL-SCNC: 22 MMOL/L (ref 20–33)
CREAT SERPL-MCNC: 0.38 MG/DL (ref 0.5–1.4)
EKG IMPRESSION: NORMAL
EOSINOPHIL # BLD AUTO: 0 K/UL (ref 0–0.51)
EOSINOPHIL NFR BLD: 0 % (ref 0–6.9)
ERYTHROCYTE [DISTWIDTH] IN BLOOD BY AUTOMATED COUNT: 52.4 FL (ref 35.9–50)
GFR SERPLBLD CREATININE-BSD FMLA CKD-EPI: 146 ML/MIN/1.73 M 2
GLUCOSE SERPL-MCNC: 101 MG/DL (ref 65–99)
HCT VFR BLD AUTO: 31.3 % (ref 37–47)
HGB BLD-MCNC: 9.7 G/DL (ref 12–16)
HYPOCHROMIA BLD QL SMEAR: ABNORMAL
LYMPHOCYTES # BLD AUTO: 2.58 K/UL (ref 1–4.8)
LYMPHOCYTES NFR BLD: 68 % (ref 22–41)
MANUAL DIFF BLD: NORMAL
MCH RBC QN AUTO: 21.9 PG (ref 27–33)
MCHC RBC AUTO-ENTMCNC: 31 G/DL (ref 32.2–35.5)
MCV RBC AUTO: 70.7 FL (ref 81.4–97.8)
MICROCYTES BLD QL SMEAR: ABNORMAL
MONOCYTES # BLD AUTO: 0.34 K/UL (ref 0–0.85)
MONOCYTES NFR BLD AUTO: 9 % (ref 0–13.4)
NEUTROPHILS # BLD AUTO: 0.87 K/UL (ref 1.82–7.42)
NEUTROPHILS NFR BLD: 23 % (ref 44–72)
NRBC # BLD AUTO: 0 K/UL
NRBC BLD-RTO: 0 /100 WBC (ref 0–0.2)
PLATELET # BLD AUTO: 258 K/UL (ref 164–446)
PLATELET BLD QL SMEAR: NORMAL
PMV BLD AUTO: 10.1 FL (ref 9–12.9)
POTASSIUM SERPL-SCNC: 3.8 MMOL/L (ref 3.6–5.5)
RBC # BLD AUTO: 4.43 M/UL (ref 4.2–5.4)
RBC BLD AUTO: PRESENT
SCHISTOCYTES BLD QL SMEAR: NORMAL
SODIUM SERPL-SCNC: 141 MMOL/L (ref 135–145)
VARIANT LYMPHS BLD QL SMEAR: NORMAL
WBC # BLD AUTO: 3.8 K/UL (ref 4.8–10.8)

## 2024-01-29 PROCEDURE — RXMED WILLOW AMBULATORY MEDICATION CHARGE: Performed by: INTERNAL MEDICINE

## 2024-01-29 PROCEDURE — 94760 N-INVAS EAR/PLS OXIMETRY 1: CPT

## 2024-01-29 PROCEDURE — G0378 HOSPITAL OBSERVATION PER HR: HCPCS

## 2024-01-29 PROCEDURE — 85007 BL SMEAR W/DIFF WBC COUNT: CPT

## 2024-01-29 PROCEDURE — 80048 BASIC METABOLIC PNL TOTAL CA: CPT

## 2024-01-29 PROCEDURE — 85027 COMPLETE CBC AUTOMATED: CPT

## 2024-01-29 PROCEDURE — 96375 TX/PRO/DX INJ NEW DRUG ADDON: CPT

## 2024-01-29 PROCEDURE — 36415 COLL VENOUS BLD VENIPUNCTURE: CPT

## 2024-01-29 PROCEDURE — 700102 HCHG RX REV CODE 250 W/ 637 OVERRIDE(OP): Performed by: INTERNAL MEDICINE

## 2024-01-29 PROCEDURE — 93010 ELECTROCARDIOGRAM REPORT: CPT | Performed by: INTERNAL MEDICINE

## 2024-01-29 PROCEDURE — 99238 HOSP IP/OBS DSCHRG MGMT 30/<: CPT | Performed by: INTERNAL MEDICINE

## 2024-01-29 PROCEDURE — 700102 HCHG RX REV CODE 250 W/ 637 OVERRIDE(OP): Performed by: HOSPITALIST

## 2024-01-29 PROCEDURE — A9270 NON-COVERED ITEM OR SERVICE: HCPCS | Performed by: HOSPITALIST

## 2024-01-29 PROCEDURE — 700101 HCHG RX REV CODE 250: Performed by: INTERNAL MEDICINE

## 2024-01-29 PROCEDURE — 700111 HCHG RX REV CODE 636 W/ 250 OVERRIDE (IP): Mod: JZ | Performed by: INTERNAL MEDICINE

## 2024-01-29 PROCEDURE — A9270 NON-COVERED ITEM OR SERVICE: HCPCS | Performed by: INTERNAL MEDICINE

## 2024-01-29 RX ORDER — FERROUS SULFATE 325(65) MG
325 TABLET ORAL
Status: DISCONTINUED | OUTPATIENT
Start: 2024-01-29 | End: 2024-01-29 | Stop reason: HOSPADM

## 2024-01-29 RX ORDER — FERROUS SULFATE 325(65) MG
325 TABLET ORAL
Qty: 30 TABLET | Refills: 15 | Status: SHIPPED | OUTPATIENT
Start: 2024-01-31

## 2024-01-29 RX ORDER — ONDANSETRON 4 MG/1
4 TABLET, ORALLY DISINTEGRATING ORAL EVERY 6 HOURS PRN
Qty: 10 TABLET | Refills: 0 | Status: SHIPPED | OUTPATIENT
Start: 2024-01-29

## 2024-01-29 RX ORDER — OSELTAMIVIR PHOSPHATE 75 MG/1
75 CAPSULE ORAL 2 TIMES DAILY
Qty: 10 CAPSULE | Refills: 0 | Status: ACTIVE | OUTPATIENT
Start: 2024-01-29

## 2024-01-29 RX ORDER — ONDANSETRON 2 MG/ML
4 INJECTION INTRAMUSCULAR; INTRAVENOUS EVERY 4 HOURS PRN
Status: DISCONTINUED | OUTPATIENT
Start: 2024-01-29 | End: 2024-01-29 | Stop reason: HOSPADM

## 2024-01-29 RX ADMIN — POTASSIUM CHLORIDE AND SODIUM CHLORIDE: 900; 150 INJECTION, SOLUTION INTRAVENOUS at 10:17

## 2024-01-29 RX ADMIN — ONDANSETRON 4 MG: 2 INJECTION INTRAMUSCULAR; INTRAVENOUS at 10:19

## 2024-01-29 RX ADMIN — POTASSIUM CHLORIDE AND SODIUM CHLORIDE: 900; 150 INJECTION, SOLUTION INTRAVENOUS at 04:47

## 2024-01-29 RX ADMIN — FERROUS SULFATE TAB 325 MG (65 MG ELEMENTAL FE) 325 MG: 325 (65 FE) TAB at 08:16

## 2024-01-29 RX ADMIN — OSELTAMIVIR PHOSPHATE 75 MG: 75 CAPSULE ORAL at 06:05

## 2024-01-29 ASSESSMENT — PAIN DESCRIPTION - PAIN TYPE
TYPE: ACUTE PAIN
TYPE: ACUTE PAIN

## 2024-01-29 NOTE — PROGRESS NOTES
1900 Received report from Dayshift nurse  1942 Performed assessment  Pt is A&Ox4, no complaints of pain, sister at bedside updated on POC.  Call light within reach, hourly rounding in place, bed in lowest position.

## 2024-01-29 NOTE — CARE PLAN
The patient is Stable - Low risk of patient condition declining or worsening    Shift Goals  Clinical Goals: Remain RA throughout shift, increase PO intake  Patient Goals: feel better/ discharge  Family Goals: na    Progress made toward(s) clinical / shift goals: Patient remained NPO until HIDA scan was complete, was hypotensive in nuc med-bolus was given, has remained SBP  post study. Tolerating PO intake.     Patient is not progressing towards the following goals:

## 2024-01-29 NOTE — CARE PLAN
The patient is Stable - Low risk of patient condition declining or worsening    Shift Goals  Clinical Goals: Pt will remain free from falls throughout my shift, maintain SPO2 at or above 90% on room air  Patient Goals: rest  Family Goals: N/A    Progress made toward(s) clinical / shift goals:  pt remained free from falls throughout my shift, pt maintained at or above 90% SPO2 on room air    Problem: Knowledge Deficit - Standard  Goal: Patient and family/care givers will demonstrate understanding of plan of care, disease process/condition, diagnostic tests and medications  Outcome: Progressing     Problem: Hemodynamics  Goal: Patient's hemodynamics, fluid balance and neurologic status will be stable or improve  Outcome: Progressing     Problem: Fluid Volume  Goal: Fluid volume balance will be maintained  Outcome: Progressing     Problem: Urinary - Renal Perfusion  Goal: Ability to achieve and maintain adequate renal perfusion and functioning will improve  Outcome: Progressing     Problem: Physical Regulation  Goal: Diagnostic test results will improve  Outcome: Progressing     Patient is not progressing towards the following goals:

## 2024-01-29 NOTE — PROGRESS NOTES
"    less pain  Ate oatmeal this AM  WNC/Hgb better    /58   Pulse 60   Temp 36.4 °C (97.6 °F) (Temporal)   Resp 18   Ht 1.59 m (5' 2.6\")   Wt 59.7 kg (131 lb 9.8 oz)   SpO2 98%   BMI 23.61 kg/m²     NAD    Abd soft  Nontender  No jaundice    Recent Labs     01/27/24  1405 01/28/24  0248 01/29/24  0044   WBC 8.3 4.0* 3.8*   RBC 3.69* 4.05* 4.43   HEMOGLOBIN 7.8* 8.8* 9.7*   HEMATOCRIT 25.1* 28.7* 31.3*   MCV 68.0* 70.9* 70.7*   MCH 21.1* 21.7* 21.9*   RDW 18.9* 51.5* 52.4*   PLATELETCT 244 217 258   MPV 9.6 9.7 10.1   NEUTSPOLYS  --  34.00* 23.00*   LYMPHOCYTES  --  59.10* 68.00*   MONOCYTES  --  6.50 9.00   EOSINOPHILS  --  0.20 0.00   BASOPHILS  --  0.20 0.00   RBCMORPHOLO  --   --  Present     A/P  Abd pain, gallstones, Viral respitratory illness  Hida negative for acute process  Diet as tolerated     Pt can follow up after DC for discussion regarding gallstone disease and possible cholecystectomy    "

## 2024-01-29 NOTE — DISCHARGE PLANNING
Case Management Discharge Planning    Admission Date: 1/27/2024  GMLOS: 2.2  ALOS: 1    6-Clicks ADL Score: 24  6-Clicks Mobility Score: 24      Anticipated Discharge Dispo:      DME Needed: No    Action(s) Taken:     LSW was informed pt was requesting to speak with LSW.   LSW met with pt at bedside. Pt inquired about her discharge, stating she currently lives in [facility]. Pt stated she would be able to call someone to come get her, but stated she would need to let them know what time.   LSW discussed pt with .  stated pt would need to tolerate her lunch prior to being discharged. LSW notified pt.     Escalations Completed: None    Medically Clear: No    Next Steps: LSW to follow for any additional needs    Barriers to Discharge: Medical clearance    Is the patient up for discharge tomorrow: No

## 2024-01-29 NOTE — PROGRESS NOTES
Discharge instructions reviewed and signed by the patient. All questions answered at this time. IV was removed. Patient was transported to ED entrance with wheelchair for discharge.

## 2024-01-29 NOTE — DISCHARGE INSTRUCTIONS
Drink plenty of fluids  Diet as tolerated  See Doctor in [place] for Heavy Menses and to follow up on Anemia

## 2024-01-29 NOTE — PROGRESS NOTES
Received bedside report from NOC, RN. Assumed care. Pt is A&O 4. Pt has no complaints of pain. Denies N/V as of assessment. Safety precaution in place, call button and belongings placed within reach. All needs met at this time. Hourly rounding in place.

## 2024-01-30 ENCOUNTER — TELEPHONE (OUTPATIENT)
Dept: HEALTH INFORMATION MANAGEMENT | Facility: OTHER | Age: 24
End: 2024-01-30
Payer: COMMERCIAL

## 2024-01-30 NOTE — DISCHARGE SUMMARY
Discharge Summary    CHIEF COMPLAINT ON ADMISSION  No chief complaint on file.      Reason for Admission  Choledocholithiasis, cholecystitis     Admission Date  1/27/2024    CODE STATUS  Prior    HPI & HOSPITAL COURSE  This is a 23 y.o. female here with influenza and possible cholecystitis.  This is a 23-year-old healthy female who is visiting from [place] apparently her whole family has had influenza and she was the last 1 to catch it she has been feeling poorly for about 4 days prior to admission.  She started having nausea and vomiting and had a syncopal episode and was brought to the neighboring emergency room where she was found to be hypotensive with a hemoglobin of 7.8 and microcytic indices.  Patient admitted to having intermittent severe menorrhagia but regular periods.  Influenza a test was positive.  Due to her GI complaints imaging studies were done at the neighboring facility suggesting possible small stone in the bile duct as well as bile duct dilation.  She was transferred here and surgery was consulted who recommended that patient have HIDA scan which was subsequently normal.  While here she continued to have episodes of hypotension and she was given a fluid bolus and aggressively hydrated and her symptoms resolved.  She was treated for nausea with Zofran and was able to tolerate diet.  Iron studies were performed revealing TIBC in the upper range of normal as well as low iron saturation and she will be started on oral iron supplementation.  She has been advised to see a physician once she returns to [place] to address her recurring menorrhagia.  She has been advised to eat as tolerated but to really push fluids over the next few days to avoid recurrent low blood pressure due to volume depletion.  No notes on file    Therefore, she is discharged in good and stable condition to home with close outpatient follow-up.      Patient was under observation    Discharge Date  1/29/2024    FOLLOW UP ITEMS POST  DISCHARGE  Follow-up with primary care when she returns to [place]    DISCHARGE DIAGNOSES  Active Problems:    Syncope and collapse (POA: Unknown)    Influenza A (POA: Unknown)    Common bile duct dilation (POA: Yes)    Elevated LFTs (POA: Yes)    Hypokalemia (POA: Unknown)    Microcytic anemia (POA: Unknown)  Resolved Problems:    * No resolved hospital problems. *      FOLLOW UP  Future Appointments   Date Time Provider Department Center   2/1/2024  3:00 PM PROVIDER [facility] None     No follow-up provider specified.    MEDICATIONS ON DISCHARGE     Medication List        START taking these medications        Instructions   FeroSul 325 (65 Fe) MG tablet  Start taking on: January 31, 2024  Generic drug: ferrous sulfate   Take 1 Tablet by mouth every 48 hours.  Dose: 325 mg     ondansetron 4 MG Tbdp  Commonly known as: Zofran ODT   Take 1 Tablet by mouth every 6 hours as needed for Nausea/Vomiting.  Dose: 4 mg     oseltamivir 75 MG Caps  Commonly known as: Tamiflu   Take 1 Capsule by mouth 2 times a day.  Dose: 75 mg            CONTINUE taking these medications        Instructions   acetaminophen 500 MG Tabs  Commonly known as: Tylenol   Take 1,000 mg by mouth 2 times a day as needed for Moderate Pain. Indications: Fever  Dose: 1,000 mg              Allergies  Allergies   Allergen Reactions    Nsaids      Pt reports that she passed out       DIET  No orders of the defined types were placed in this encounter.  Encouraged to  drink plenty fluids    ACTIVITY  As tolerated.  Weight bearing as tolerated    CONSULTATIONS  General Surgery    PROCEDURES  none    LABORATORY  Lab Results   Component Value Date    SODIUM 141 01/29/2024    POTASSIUM 3.8 01/29/2024    CHLORIDE 108 01/29/2024    CO2 22 01/29/2024    GLUCOSE 101 (H) 01/29/2024    BUN 10 01/29/2024    CREATININE 0.38 (L) 01/29/2024        Lab Results   Component Value Date    WBC 3.8 (L) 01/29/2024    HEMOGLOBIN 9.7 (L) 01/29/2024    HEMATOCRIT 31.3 (L)  01/29/2024    PLATELETCT 258 01/29/2024

## 2024-02-02 LAB
BACTERIA BLD CULT: NORMAL
BACTERIA BLD CULT: NORMAL
SIGNIFICANT IND 70042: NORMAL
SIGNIFICANT IND 70042: NORMAL
SITE SITE: NORMAL
SITE SITE: NORMAL
SOURCE SOURCE: NORMAL
SOURCE SOURCE: NORMAL

## 2024-10-21 NOTE — ASSESSMENT & PLAN NOTE
-Initial hemoglobin 7.8, received 1 Unit PRBC at outside facility.  Check iron studies, patient having menorrhagia, check TSH  Hemoglobin better this morning     Universal Safety Interventions